# Patient Record
Sex: FEMALE | Race: OTHER | HISPANIC OR LATINO
[De-identification: names, ages, dates, MRNs, and addresses within clinical notes are randomized per-mention and may not be internally consistent; named-entity substitution may affect disease eponyms.]

---

## 2017-03-23 ENCOUNTER — RECORD ABSTRACTING (OUTPATIENT)
Age: 47
End: 2017-03-23

## 2017-03-23 DIAGNOSIS — J45.909 UNSPECIFIED ASTHMA, UNCOMPLICATED: ICD-10-CM

## 2017-03-23 DIAGNOSIS — Z88.9 ALLERGY STATUS TO UNSPECIFIED DRUGS, MEDICAMENTS AND BIOLOGICAL SUBSTANCES: ICD-10-CM

## 2017-03-23 DIAGNOSIS — Z12.4 ENCOUNTER FOR SCREENING FOR MALIGNANT NEOPLASM OF CERVIX: ICD-10-CM

## 2017-03-23 DIAGNOSIS — Z83.3 FAMILY HISTORY OF DIABETES MELLITUS: ICD-10-CM

## 2017-03-23 DIAGNOSIS — B96.89 ACUTE VAGINITIS: ICD-10-CM

## 2017-03-23 DIAGNOSIS — F41.9 ANXIETY DISORDER, UNSPECIFIED: ICD-10-CM

## 2017-03-23 DIAGNOSIS — Z78.9 OTHER SPECIFIED HEALTH STATUS: ICD-10-CM

## 2017-03-23 DIAGNOSIS — Z80.3 FAMILY HISTORY OF MALIGNANT NEOPLASM OF BREAST: ICD-10-CM

## 2017-03-23 DIAGNOSIS — Z82.49 FAMILY HISTORY OF ISCHEMIC HEART DISEASE AND OTHER DISEASES OF THE CIRCULATORY SYSTEM: ICD-10-CM

## 2017-03-23 DIAGNOSIS — Z33.2 ENCOUNTER FOR ELECTIVE TERMINATION OF PREGNANCY: ICD-10-CM

## 2017-03-23 DIAGNOSIS — I10 ESSENTIAL (PRIMARY) HYPERTENSION: ICD-10-CM

## 2017-03-23 DIAGNOSIS — N76.0 ACUTE VAGINITIS: ICD-10-CM

## 2017-03-23 DIAGNOSIS — F32.9 MAJOR DEPRESSIVE DISORDER, SINGLE EPISODE, UNSPECIFIED: ICD-10-CM

## 2017-03-23 RX ORDER — ALBUTEROL 90 MCG
AEROSOL (GRAM) INHALATION
Refills: 0 | Status: ACTIVE | COMMUNITY

## 2017-03-23 RX ORDER — ALBUTEROL SULFATE 90 UG/1
AEROSOL, METERED RESPIRATORY (INHALATION)
Refills: 0 | Status: ACTIVE | COMMUNITY

## 2017-03-23 RX ORDER — MONTELUKAST SODIUM 10 MG/1
TABLET, FILM COATED ORAL
Refills: 0 | Status: ACTIVE | COMMUNITY

## 2017-04-02 ENCOUNTER — INPATIENT (INPATIENT)
Facility: HOSPITAL | Age: 47
LOS: 3 days | Discharge: HOME | End: 2017-04-06
Attending: SPECIALIST

## 2017-04-21 ENCOUNTER — EMERGENCY (EMERGENCY)
Facility: HOSPITAL | Age: 47
LOS: 1 days | Discharge: HOME | End: 2017-04-21
Attending: EMERGENCY MEDICINE | Admitting: EMERGENCY MEDICINE
Payer: MEDICARE

## 2017-04-21 VITALS
DIASTOLIC BLOOD PRESSURE: 88 MMHG | TEMPERATURE: 98 F | WEIGHT: 141.98 LBS | RESPIRATION RATE: 16 BRPM | HEIGHT: 67 IN | SYSTOLIC BLOOD PRESSURE: 157 MMHG | OXYGEN SATURATION: 98 % | HEART RATE: 90 BPM

## 2017-04-21 VITALS
TEMPERATURE: 98 F | OXYGEN SATURATION: 100 % | HEART RATE: 78 BPM | RESPIRATION RATE: 16 BRPM | DIASTOLIC BLOOD PRESSURE: 92 MMHG | SYSTOLIC BLOOD PRESSURE: 142 MMHG

## 2017-04-21 DIAGNOSIS — M54.9 DORSALGIA, UNSPECIFIED: ICD-10-CM

## 2017-04-21 DIAGNOSIS — Z91.012 ALLERGY TO EGGS: ICD-10-CM

## 2017-04-21 DIAGNOSIS — Z88.7 ALLERGY STATUS TO SERUM AND VACCINE: ICD-10-CM

## 2017-04-21 DIAGNOSIS — Z79.52 LONG TERM (CURRENT) USE OF SYSTEMIC STEROIDS: ICD-10-CM

## 2017-04-21 DIAGNOSIS — S46.912A STRAIN OF UNSPECIFIED MUSCLE, FASCIA AND TENDON AT SHOULDER AND UPPER ARM LEVEL, LEFT ARM, INITIAL ENCOUNTER: ICD-10-CM

## 2017-04-21 DIAGNOSIS — S16.1XXA STRAIN OF MUSCLE, FASCIA AND TENDON AT NECK LEVEL, INITIAL ENCOUNTER: ICD-10-CM

## 2017-04-21 DIAGNOSIS — S46.911A STRAIN OF UNSPECIFIED MUSCLE, FASCIA AND TENDON AT SHOULDER AND UPPER ARM LEVEL, RIGHT ARM, INITIAL ENCOUNTER: ICD-10-CM

## 2017-04-21 DIAGNOSIS — J45.909 UNSPECIFIED ASTHMA, UNCOMPLICATED: ICD-10-CM

## 2017-04-21 DIAGNOSIS — Z79.899 OTHER LONG TERM (CURRENT) DRUG THERAPY: ICD-10-CM

## 2017-04-21 PROCEDURE — 99283 EMERGENCY DEPT VISIT LOW MDM: CPT | Mod: 25

## 2017-04-21 PROCEDURE — 99284 EMERGENCY DEPT VISIT MOD MDM: CPT

## 2017-04-21 RX ORDER — KETOROLAC TROMETHAMINE 30 MG/ML
30 SYRINGE (ML) INJECTION ONCE
Qty: 0 | Refills: 0 | Status: DISCONTINUED | OUTPATIENT
Start: 2017-04-21 | End: 2017-04-21

## 2017-04-21 RX ADMIN — Medication 30 MILLIGRAM(S): at 13:55

## 2017-04-21 NOTE — ED PROVIDER NOTE - MUSCULOSKELETAL, MLM
Spine appears normal, range of motion is not limited, no muscle or joint tenderness, pain along sts around scalpula b/l

## 2017-04-21 NOTE — ED PROVIDER NOTE - MEDICAL DECISION MAKING DETAILS
EO 46 yo otherwise healthy f presents to ed for neck pain b/l arm pain and stiffness for the past few months.  Pt reports she has been seen at multiple different hospitals and was given rx for baclofen and robaxin but ran out yesterday.  Pt likely with muscle strain/spasm, gave a dose of toradol reassessed, reports symptomatic improvement will refer to outpatient ortho

## 2017-04-21 NOTE — ED PROVIDER NOTE - OBJECTIVE STATEMENT
EO 48 yo otherwise healthy f presents to ed for neck pain b/l arm pain and stiffness for the past few months.  Pt reports she has been seen at multiple different hospitals and was given rx for baclofen and robaxin but ran out yesterday.  Pt otherwise denies fevers, chills, numbness/accidents/injuries traumas, numbness tingling.  Pt has not taken anything for pain today

## 2017-04-21 NOTE — ED ADULT TRIAGE NOTE - CHIEF COMPLAINT QUOTE
Pt c/o L arm pain and back pain since March, pt states she was seen then and told she had swelling in her spine. Pt states pain worsening since yesterday.

## 2017-04-21 NOTE — ED ADULT NURSE NOTE - OBJECTIVE STATEMENT
presented to ED with neck pressure down shoulder with numbness down left arm since March. Admitted in [facility] for same. presented to ED with neck pressure down shoulder with numbness down left arm since March. Admitted in [facility] for same. Pain worsened yesterday. Decreased mobility.

## 2017-04-21 NOTE — ED ADULT NURSE NOTE - NS TRANSFER DISPOSITION PATIENT BELONGINGS
Observation/Bedded OP   PT Plan of Care Note    Primary Insurance: [employer]    Secondary Insurance: N/A    Note sent for required physician co-signature: No          Is the patient currently receiving skilled home care services (RN or therapy) No    Diagnosis:   1. Syncope, unspecified syncope type    2. Weakness    3. Falls, initial encounter    4. Spondylosis, cervical, with myelopathy    5. Cerebrovascular small vessel disease    6. Diabetic peripheral neuropathy    7. Polypharmacy        Co morbidities:   Patient Active Problem List   Diagnosis   • Type II or unspecified type diabetes mellitus without mention of complication, not stated as uncontrolled   • Essential hypertension, benign   • Other and unspecified hyperlipidemia   • Chronic obstructive asthma, unspecified   • Generalized anxiety disorder   • Colon polyps       Clinical Presentation: Evolving clinical presentation with changing characteristics                      Therapy Diagnosis: Impaired gait    Functional Status: (as of date/time noted)  Bed Mobility:  Rolling to the Right: Supervision (Supv) (03/25/17 1500)  Rolling to the Left: Supervision (Supv) (03/25/17 1500)  Boosting: Supervision (Supv) (03/25/17 1500)  Supine to Sit: Supervision (Supv) (03/25/17 1500)  Sit to Supine: Supervision (Supv) (03/25/17 1500)  Bed Mobility Comments: completes all bed mobility with supervision, manages covers well, modified independent sitting balance (03/25/17 1500)    Transfers:  Sit to Stand: Supervision (Supv) (03/25/17 1500)  Stand to Sit: Supervision (Supv) (03/25/17 1500)  Stand Pivot Transfers: Supervision (Supv) (03/25/17 1500)  Toilet Transfers: Supervision (Supv) (03/25/17 1500)  Assistive Device/: 2-wheeled walker (03/25/17 1500)  Transfer Comments 1: completes all transfers on/ pff various surfaces with supervision, good balance with initial stance, follows safety precautions (03/25/17 1500)  Transfer  Comments 2: cues for proper hand placement and ww proximity (03/24/17 5665)     Ambulation:  Gait  Gait Assistance: Supervision (Supv) (03/25/17 1500)  Assistive Device/: 2-wheeled walker (03/25/17 1500)  Ambulation Distance (Feet): 175 Feet (03/25/17 1500)  Gait Comments 1: ambulation in room and hallways with supervision using 2WW (03/25/17 1500)  Gait Comments 2: no loss of balance with turns or obstacle management (03/25/17 1500)  Gait Comments 3: good activity tolerance, denies SOB or weakness (03/25/17 1500)         Stairs:     See PT Flowsheet for full details regarding the PT therapy provided.    AM-PAC Outcomes  Basic Mobility Domain  How much help from another person does the patient currently need? *  Task Score  Norm   1. Turning from back to side while in flat bed without use of bedrails? 3 - A Little   2. Moving from lying on back to sitting:   3 - A Little   3. Moving to and from a bed to a chair (including wheelchair) 3 - A Little   4. Standing up from a chair using your arms 3 - A Little   5. To walk in a hospital room? 3 - A Little   6. Climbing 3-5 steps with a railing?  3 - A Little   Raw Score: 18/24   Converted Score: 41.05 (Raw score = 18)   G code conversion CK: 40- 59% impairment (Raw score: 13-18)     Converted score >42.9 predictive of discharge to home  *Score based on clinical judgment/expected performance, may not have been performed during this session  Scoring Guidelines  1) Patient may use assistive devices unless otherwise indicated in question  2) Do not consider help for management of medical devices only (IV poles, catheters, NG, etc) as part of assist level  3) If activity was not observed and patient unable to do the activity, select \"Total\" .  If the patient can do the activity but was not directly observed, use profession judgment to determine how much assistance would be needed.    Education: On this date, the patient was educated on safety with mobility skills  using 2WW.   The response to education was: Needs reinforcement    Assessment:  Patient's overall level of function is supervision to min assist for bed mobility, transfers and ambulation 175 feet with 2WW. Patient demonstrates remarkable improvement in safety and mobility skills today compared to previous PT session, progressing from total assist level to min assist/ supervision for pivot transfers. Patient may be able to return home directly with significant other if continues to make progress. Will see patient for PT tomorrow to determine if safe for home D/C. Will trial cane and stair management.     Barriers to Discharge: hx of cognitive deficits per chart, hx of multiple falls, weakness, imbalance    Long Term Treatment Goals:  Bed Mobility Discharge Goal: modified independent  Transfer Discharge Goal: supv with ww  Ambulation Discharge Goal: min assist with ww 50 feet  Stairs Discharge Goal: 4 stairs with rail min assist  Therapeutic Exercise Discharge Goal:    Other Discharge Goal 1: Karimi score of 35/56 points for gait safety with ww  Other Discharge Goal 2:      Treatment Interventions: Functional transfer training, Strengthening, Endurance training, Cognitive reorientation, Patient/Family training, Equipment eval/education, Bed mobility, Gait training, Compensatory technique education, Stairs retraining, Safety Education, Neuromuscular re-education  Amount: 31-60 minutes  Frequency:   6x/week  Duration: 5 days    Plan for Next Session: bring cane for transfers and ambulation, stair management     Recommendations for Discharge: Home    Billing Information:    Timed Procedures:   $ Gait Trainin-22 mins,  $ Therapy Activities per 15 min: 23-37 mins,   Total Treatment Time:   PT Time Spent: 45 minutes    Timed Treatment Minutes:  OBS Patients Only:  PT Timed Code TX Minutes: 45 minutes    The co-signature indicates that the physician certifies the need for PT furnished under this plan of treatment while  under his/her care.   with patient

## 2017-06-27 DIAGNOSIS — M62.838 OTHER MUSCLE SPASM: ICD-10-CM

## 2017-06-27 DIAGNOSIS — F32.9 MAJOR DEPRESSIVE DISORDER, SINGLE EPISODE, UNSPECIFIED: ICD-10-CM

## 2017-06-27 DIAGNOSIS — R20.8 OTHER DISTURBANCES OF SKIN SENSATION: ICD-10-CM

## 2017-06-27 DIAGNOSIS — M54.2 CERVICALGIA: ICD-10-CM

## 2017-06-27 DIAGNOSIS — J45.909 UNSPECIFIED ASTHMA, UNCOMPLICATED: ICD-10-CM

## 2017-06-27 DIAGNOSIS — F41.9 ANXIETY DISORDER, UNSPECIFIED: ICD-10-CM

## 2017-06-27 DIAGNOSIS — M48.02 SPINAL STENOSIS, CERVICAL REGION: ICD-10-CM

## 2017-06-27 DIAGNOSIS — M50.122 CERVICAL DISC DISORDER AT C5-C6 LEVEL WITH RADICULOPATHY: ICD-10-CM

## 2017-08-09 ENCOUNTER — APPOINTMENT (OUTPATIENT)
Dept: VASCULAR SURGERY | Facility: CLINIC | Age: 47
End: 2017-08-09
Payer: MEDICARE

## 2017-08-09 VITALS
HEIGHT: 67 IN | SYSTOLIC BLOOD PRESSURE: 118 MMHG | WEIGHT: 142 LBS | DIASTOLIC BLOOD PRESSURE: 80 MMHG | BODY MASS INDEX: 22.29 KG/M2

## 2017-08-09 DIAGNOSIS — M79.605 PAIN IN RIGHT LEG: ICD-10-CM

## 2017-08-09 DIAGNOSIS — M79.604 PAIN IN RIGHT LEG: ICD-10-CM

## 2017-08-09 PROCEDURE — 99213 OFFICE O/P EST LOW 20 MIN: CPT

## 2017-08-09 PROCEDURE — 93970 EXTREMITY STUDY: CPT

## 2017-09-03 ENCOUNTER — EMERGENCY (EMERGENCY)
Facility: HOSPITAL | Age: 47
LOS: 0 days | Discharge: HOME | End: 2017-09-03

## 2017-09-03 DIAGNOSIS — M54.2 CERVICALGIA: ICD-10-CM

## 2017-09-03 DIAGNOSIS — F41.9 ANXIETY DISORDER, UNSPECIFIED: ICD-10-CM

## 2017-09-03 DIAGNOSIS — N39.0 URINARY TRACT INFECTION, SITE NOT SPECIFIED: ICD-10-CM

## 2017-09-03 DIAGNOSIS — M54.12 RADICULOPATHY, CERVICAL REGION: ICD-10-CM

## 2017-09-03 DIAGNOSIS — J45.909 UNSPECIFIED ASTHMA, UNCOMPLICATED: ICD-10-CM

## 2017-09-03 DIAGNOSIS — Z91.012 ALLERGY TO EGGS: ICD-10-CM

## 2017-09-03 DIAGNOSIS — Z88.8 ALLERGY STATUS TO OTHER DRUGS, MEDICAMENTS AND BIOLOGICAL SUBSTANCES: ICD-10-CM

## 2017-09-03 DIAGNOSIS — R30.0 DYSURIA: ICD-10-CM

## 2017-09-03 DIAGNOSIS — F32.9 MAJOR DEPRESSIVE DISORDER, SINGLE EPISODE, UNSPECIFIED: ICD-10-CM

## 2017-09-25 ENCOUNTER — EMERGENCY (EMERGENCY)
Facility: HOSPITAL | Age: 47
LOS: 0 days | Discharge: HOME | End: 2017-09-25

## 2017-09-25 DIAGNOSIS — M54.2 CERVICALGIA: ICD-10-CM

## 2017-09-25 DIAGNOSIS — Z88.7 ALLERGY STATUS TO SERUM AND VACCINE: ICD-10-CM

## 2017-09-25 DIAGNOSIS — R10.30 LOWER ABDOMINAL PAIN, UNSPECIFIED: ICD-10-CM

## 2017-09-25 DIAGNOSIS — F32.9 MAJOR DEPRESSIVE DISORDER, SINGLE EPISODE, UNSPECIFIED: ICD-10-CM

## 2017-09-25 DIAGNOSIS — N83.201 UNSPECIFIED OVARIAN CYST, RIGHT SIDE: ICD-10-CM

## 2017-09-25 DIAGNOSIS — Z91.012 ALLERGY TO EGGS: ICD-10-CM

## 2017-09-25 DIAGNOSIS — N39.0 URINARY TRACT INFECTION, SITE NOT SPECIFIED: ICD-10-CM

## 2017-09-25 DIAGNOSIS — J45.909 UNSPECIFIED ASTHMA, UNCOMPLICATED: ICD-10-CM

## 2017-09-25 DIAGNOSIS — M54.12 RADICULOPATHY, CERVICAL REGION: ICD-10-CM

## 2017-09-28 ENCOUNTER — OUTPATIENT (OUTPATIENT)
Dept: OUTPATIENT SERVICES | Facility: HOSPITAL | Age: 47
LOS: 1 days | Discharge: HOME | End: 2017-09-28

## 2017-09-28 ENCOUNTER — APPOINTMENT (OUTPATIENT)
Dept: OBGYN | Facility: CLINIC | Age: 47
End: 2017-09-28

## 2017-09-28 VITALS
WEIGHT: 151.7 LBS | HEIGHT: 67 IN | SYSTOLIC BLOOD PRESSURE: 120 MMHG | BODY MASS INDEX: 23.81 KG/M2 | DIASTOLIC BLOOD PRESSURE: 70 MMHG

## 2017-09-28 DIAGNOSIS — M54.12 RADICULOPATHY, CERVICAL REGION: ICD-10-CM

## 2017-09-28 DIAGNOSIS — Z01.419 ENCOUNTER FOR GYNECOLOGICAL EXAMINATION (GENERAL) (ROUTINE) W/OUT ABNORMAL FINDINGS: ICD-10-CM

## 2017-09-28 DIAGNOSIS — M54.2 CERVICALGIA: ICD-10-CM

## 2017-09-28 DIAGNOSIS — N83.209 UNSPECIFIED OVARIAN CYST, UNSPECIFIED SIDE: ICD-10-CM

## 2017-09-28 DIAGNOSIS — N39.0 URINARY TRACT INFECTION, SITE NOT SPECIFIED: ICD-10-CM

## 2017-09-28 DIAGNOSIS — A49.9 URINARY TRACT INFECTION, SITE NOT SPECIFIED: ICD-10-CM

## 2017-10-02 ENCOUNTER — RESULT REVIEW (OUTPATIENT)
Age: 47
End: 2017-10-02

## 2017-10-10 DIAGNOSIS — Z01.419 ENCOUNTER FOR GYNECOLOGICAL EXAMINATION (GENERAL) (ROUTINE) WITHOUT ABNORMAL FINDINGS: ICD-10-CM

## 2017-10-10 LAB — HPV I/H RISK 1 DNA CVX QL PROBE+SIG AMP: NOT DETECTED

## 2017-10-16 ENCOUNTER — APPOINTMENT (OUTPATIENT)
Dept: ANTEPARTUM | Facility: CLINIC | Age: 47
End: 2017-10-16

## 2017-10-30 ENCOUNTER — APPOINTMENT (OUTPATIENT)
Dept: OBGYN | Facility: CLINIC | Age: 47
End: 2017-10-30

## 2017-12-29 ENCOUNTER — OUTPATIENT (OUTPATIENT)
Dept: OUTPATIENT SERVICES | Facility: HOSPITAL | Age: 47
LOS: 1 days | Discharge: HOME | End: 2017-12-29

## 2017-12-29 DIAGNOSIS — M50.01 CERVICAL DISC DISORDER WITH MYELOPATHY, HIGH CERVICAL REGION: ICD-10-CM

## 2018-01-05 ENCOUNTER — OUTPATIENT (OUTPATIENT)
Dept: OUTPATIENT SERVICES | Facility: HOSPITAL | Age: 48
LOS: 1 days | Discharge: HOME | End: 2018-01-05

## 2018-01-05 DIAGNOSIS — M50.01 CERVICAL DISC DISORDER WITH MYELOPATHY, HIGH CERVICAL REGION: ICD-10-CM

## 2018-01-30 DIAGNOSIS — M54.12 RADICULOPATHY, CERVICAL REGION: ICD-10-CM

## 2018-01-30 DIAGNOSIS — M54.2 CERVICALGIA: ICD-10-CM

## 2018-02-04 DIAGNOSIS — M54.2 CERVICALGIA: ICD-10-CM

## 2018-02-04 DIAGNOSIS — M54.12 RADICULOPATHY, CERVICAL REGION: ICD-10-CM

## 2018-03-21 ENCOUNTER — EMERGENCY (EMERGENCY)
Facility: HOSPITAL | Age: 48
LOS: 0 days | Discharge: HOME | End: 2018-03-22
Attending: EMERGENCY MEDICINE

## 2018-03-21 VITALS
DIASTOLIC BLOOD PRESSURE: 85 MMHG | TEMPERATURE: 99 F | RESPIRATION RATE: 20 BRPM | OXYGEN SATURATION: 98 % | HEART RATE: 78 BPM | SYSTOLIC BLOOD PRESSURE: 127 MMHG

## 2018-03-21 DIAGNOSIS — J45.909 UNSPECIFIED ASTHMA, UNCOMPLICATED: ICD-10-CM

## 2018-03-21 DIAGNOSIS — I10 ESSENTIAL (PRIMARY) HYPERTENSION: ICD-10-CM

## 2018-03-21 DIAGNOSIS — R50.9 FEVER, UNSPECIFIED: ICD-10-CM

## 2018-03-21 DIAGNOSIS — R11.2 NAUSEA WITH VOMITING, UNSPECIFIED: ICD-10-CM

## 2018-03-21 DIAGNOSIS — F41.9 ANXIETY DISORDER, UNSPECIFIED: ICD-10-CM

## 2018-03-21 DIAGNOSIS — F31.9 BIPOLAR DISORDER, UNSPECIFIED: ICD-10-CM

## 2018-03-21 DIAGNOSIS — R10.9 UNSPECIFIED ABDOMINAL PAIN: ICD-10-CM

## 2018-03-21 DIAGNOSIS — R10.13 EPIGASTRIC PAIN: ICD-10-CM

## 2018-03-21 DIAGNOSIS — Z91.012 ALLERGY TO EGGS: ICD-10-CM

## 2018-03-21 DIAGNOSIS — Z88.7 ALLERGY STATUS TO SERUM AND VACCINE: ICD-10-CM

## 2018-03-21 LAB
ALBUMIN SERPL ELPH-MCNC: 4.1 G/DL — SIGNIFICANT CHANGE UP (ref 3.5–5.2)
ALP SERPL-CCNC: 31 U/L — SIGNIFICANT CHANGE UP (ref 30–115)
ALT FLD-CCNC: 7 U/L — SIGNIFICANT CHANGE UP (ref 0–41)
ANION GAP SERPL CALC-SCNC: 12 MMOL/L — SIGNIFICANT CHANGE UP (ref 7–14)
APPEARANCE UR: CLEAR — SIGNIFICANT CHANGE UP
AST SERPL-CCNC: 12 U/L — SIGNIFICANT CHANGE UP (ref 0–41)
BASOPHILS # BLD AUTO: 0.04 K/UL — SIGNIFICANT CHANGE UP (ref 0–0.2)
BASOPHILS NFR BLD AUTO: 0.8 % — SIGNIFICANT CHANGE UP (ref 0–1)
BILIRUB SERPL-MCNC: 0.4 MG/DL — SIGNIFICANT CHANGE UP (ref 0.2–1.2)
BILIRUB UR-MCNC: NEGATIVE — SIGNIFICANT CHANGE UP
BUN SERPL-MCNC: 17 MG/DL — SIGNIFICANT CHANGE UP (ref 10–20)
CALCIUM SERPL-MCNC: 8.8 MG/DL — SIGNIFICANT CHANGE UP (ref 8.5–10.1)
CHLORIDE SERPL-SCNC: 101 MMOL/L — SIGNIFICANT CHANGE UP (ref 98–110)
CO2 SERPL-SCNC: 27 MMOL/L — SIGNIFICANT CHANGE UP (ref 17–32)
COLOR SPEC: YELLOW — SIGNIFICANT CHANGE UP
CREAT SERPL-MCNC: 0.9 MG/DL — SIGNIFICANT CHANGE UP (ref 0.7–1.5)
DIFF PNL FLD: (no result)
EOSINOPHIL # BLD AUTO: 0.02 K/UL — SIGNIFICANT CHANGE UP (ref 0–0.7)
EOSINOPHIL NFR BLD AUTO: 0.4 % — SIGNIFICANT CHANGE UP (ref 0–8)
GLUCOSE SERPL-MCNC: 90 MG/DL — SIGNIFICANT CHANGE UP (ref 70–110)
GLUCOSE UR QL: NEGATIVE — SIGNIFICANT CHANGE UP
HCT VFR BLD CALC: 29.5 % — LOW (ref 37–47)
HGB BLD-MCNC: 9.6 G/DL — LOW (ref 12–16)
IMM GRANULOCYTES NFR BLD AUTO: 0.2 % — SIGNIFICANT CHANGE UP (ref 0.1–0.3)
KETONES UR-MCNC: NEGATIVE — SIGNIFICANT CHANGE UP
LACTATE SERPL-SCNC: 2.1 MMOL/L — SIGNIFICANT CHANGE UP (ref 0.5–2.2)
LEUKOCYTE ESTERASE UR-ACNC: NEGATIVE — SIGNIFICANT CHANGE UP
LIDOCAIN IGE QN: 50 U/L — SIGNIFICANT CHANGE UP (ref 7–60)
LYMPHOCYTES # BLD AUTO: 0.99 K/UL — LOW (ref 1.2–3.4)
LYMPHOCYTES # BLD AUTO: 20.9 % — SIGNIFICANT CHANGE UP (ref 20.5–51.1)
MCHC RBC-ENTMCNC: 28.7 PG — SIGNIFICANT CHANGE UP (ref 27–31)
MCHC RBC-ENTMCNC: 32.5 G/DL — SIGNIFICANT CHANGE UP (ref 32–37)
MCV RBC AUTO: 88.3 FL — SIGNIFICANT CHANGE UP (ref 81–99)
MONOCYTES # BLD AUTO: 0.32 K/UL — SIGNIFICANT CHANGE UP (ref 0.1–0.6)
MONOCYTES NFR BLD AUTO: 6.8 % — SIGNIFICANT CHANGE UP (ref 1.7–9.3)
NEUTROPHILS # BLD AUTO: 3.36 K/UL — SIGNIFICANT CHANGE UP (ref 1.4–6.5)
NEUTROPHILS NFR BLD AUTO: 70.9 % — SIGNIFICANT CHANGE UP (ref 42.2–75.2)
NITRITE UR-MCNC: NEGATIVE — SIGNIFICANT CHANGE UP
PH UR: 7.5 — SIGNIFICANT CHANGE UP (ref 5–8)
PLATELET # BLD AUTO: 239 K/UL — SIGNIFICANT CHANGE UP (ref 130–400)
POTASSIUM SERPL-MCNC: 3.6 MMOL/L — SIGNIFICANT CHANGE UP (ref 3.5–5)
POTASSIUM SERPL-SCNC: 3.6 MMOL/L — SIGNIFICANT CHANGE UP (ref 3.5–5)
PROT SERPL-MCNC: 6.9 G/DL — SIGNIFICANT CHANGE UP (ref 6–8)
PROT UR-MCNC: NEGATIVE — SIGNIFICANT CHANGE UP
RBC # BLD: 3.34 M/UL — LOW (ref 4.2–5.4)
RBC # FLD: 14.2 % — SIGNIFICANT CHANGE UP (ref 11.5–14.5)
SODIUM SERPL-SCNC: 140 MMOL/L — SIGNIFICANT CHANGE UP (ref 135–146)
SP GR SPEC: 1.01 — SIGNIFICANT CHANGE UP (ref 1.01–1.03)
UROBILINOGEN FLD QL: 0.2 — SIGNIFICANT CHANGE UP (ref 0.2–0.2)
WBC # BLD: 4.74 K/UL — LOW (ref 4.8–10.8)
WBC # FLD AUTO: 4.74 K/UL — LOW (ref 4.8–10.8)

## 2018-03-21 RX ORDER — MORPHINE SULFATE 50 MG/1
4 CAPSULE, EXTENDED RELEASE ORAL ONCE
Qty: 0 | Refills: 0 | Status: DISCONTINUED | OUTPATIENT
Start: 2018-03-21 | End: 2018-03-21

## 2018-03-21 RX ORDER — SODIUM CHLORIDE 9 MG/ML
1000 INJECTION INTRAMUSCULAR; INTRAVENOUS; SUBCUTANEOUS ONCE
Qty: 0 | Refills: 0 | Status: COMPLETED | OUTPATIENT
Start: 2018-03-21 | End: 2018-03-21

## 2018-03-21 RX ORDER — ONDANSETRON 8 MG/1
4 TABLET, FILM COATED ORAL ONCE
Qty: 0 | Refills: 0 | Status: COMPLETED | OUTPATIENT
Start: 2018-03-21 | End: 2018-03-21

## 2018-03-21 RX ORDER — METHOCARBAMOL 500 MG/1
0 TABLET, FILM COATED ORAL
Qty: 0 | Refills: 0 | COMMUNITY

## 2018-03-21 RX ORDER — MORPHINE SULFATE 50 MG/1
2 CAPSULE, EXTENDED RELEASE ORAL ONCE
Qty: 0 | Refills: 0 | Status: DISCONTINUED | OUTPATIENT
Start: 2018-03-21 | End: 2018-03-21

## 2018-03-21 RX ORDER — ACETAMINOPHEN 500 MG
0 TABLET ORAL
Qty: 0 | Refills: 0 | COMMUNITY

## 2018-03-21 RX ORDER — FAMOTIDINE 10 MG/ML
20 INJECTION INTRAVENOUS ONCE
Qty: 0 | Refills: 0 | Status: COMPLETED | OUTPATIENT
Start: 2018-03-21 | End: 2018-03-21

## 2018-03-21 RX ADMIN — ONDANSETRON 4 MILLIGRAM(S): 8 TABLET, FILM COATED ORAL at 21:46

## 2018-03-21 RX ADMIN — FAMOTIDINE 20 MILLIGRAM(S): 10 INJECTION INTRAVENOUS at 21:46

## 2018-03-21 RX ADMIN — MORPHINE SULFATE 4 MILLIGRAM(S): 50 CAPSULE, EXTENDED RELEASE ORAL at 21:46

## 2018-03-21 RX ADMIN — MORPHINE SULFATE 2 MILLIGRAM(S): 50 CAPSULE, EXTENDED RELEASE ORAL at 21:00

## 2018-03-21 RX ADMIN — SODIUM CHLORIDE 1000 MILLILITER(S): 9 INJECTION INTRAMUSCULAR; INTRAVENOUS; SUBCUTANEOUS at 21:46

## 2018-03-21 NOTE — ED PROVIDER NOTE - PROGRESS NOTE DETAILS
Bedside sono - negative fast. No GB wall thickening, pericholecystic fluid. CBD <4mm CORNELIUS villasenor s/o from Dr. Winslow Feeling and looking significantly better. Feeling and looking significantly better. Baseline Hb 9.6 - 10 from prior charts.

## 2018-03-21 NOTE — ED PROVIDER NOTE - MEDICAL DECISION MAKING DETAILS
pt signed out to me - 49yo f non-contrib pmh p/w epigastric pain x 1d + vomiting - labs w/unchanged mild anemia, nl CT AP - sx improved on reassessment, all result d/w pt, discharged to home

## 2018-03-21 NOTE — ED PROVIDER NOTE - OBJECTIVE STATEMENT
48 M with a hx of asthma, anxiety, depression, presents with epigastric pain x 1 day. Started yesterday after eating. Located in epigastrium, descibed as sharp, nonradiating. Associated  with subjective fevers, chills, nausea and 4-5 episodes on NBNB emesis. Last BM 2 days ago, normal. Denies CP, SOB, diarrhea, back pain, dysuria.

## 2018-03-21 NOTE — ED PROVIDER NOTE - NS ED ROS FT
Constitutional: See HPI.  Eyes: No visual changes, eye pain or discharge.  ENMT: No hearing changes, pain, discharge or infections. No neck pain or stiffness.  Cardiac: No chest pain, SOB or edema. No chest pain with exertion.  Respiratory: No cough or respiratory distress.   GI: +nausea, vomiting, abdominal pain.  : No dysuria, frequency or burning.  MS: No myalgia, muscle weakness, joint pain or back pain.  Neuro: No headache or weakness. No LOC.  Skin: No skin rash.

## 2018-03-21 NOTE — ED PROVIDER NOTE - PHYSICAL EXAMINATION
AOx4, visibly uncomfortable, speaking in full sentences. Skin  warm and dry, no acute rash. Head normocephalic, atraumatic. PERRLA/EOMI, conjunctiva and sclera clear. MM moist, no nasal discharge.  Pharynx and TM's unremarkable.  Neck supple nt, no meningeal signs. Heart RRR s1s2 nl, no rub/murmur. Lungs- No retractions, BS equal, CTAB. Abdomen soft nd tender epigastrium with voluntary guarding, no rebound. Extremities- moves all, +equal distal pulses, brisk cap refill, sensation wnl, normal ROM. No LE edema, calves nttp b/l.

## 2018-03-22 VITALS
HEART RATE: 71 BPM | RESPIRATION RATE: 18 BRPM | DIASTOLIC BLOOD PRESSURE: 77 MMHG | TEMPERATURE: 97 F | SYSTOLIC BLOOD PRESSURE: 132 MMHG | OXYGEN SATURATION: 98 %

## 2018-03-22 RX ORDER — FAMOTIDINE 10 MG/ML
1 INJECTION INTRAVENOUS
Qty: 14 | Refills: 0 | OUTPATIENT
Start: 2018-03-22 | End: 2018-04-04

## 2018-03-22 RX ORDER — FAMOTIDINE 10 MG/ML
20 INJECTION INTRAVENOUS DAILY
Qty: 0 | Refills: 0 | Status: DISCONTINUED | OUTPATIENT
Start: 2018-03-22 | End: 2018-03-22

## 2018-03-22 RX ADMIN — FAMOTIDINE 20 MILLIGRAM(S): 10 INJECTION INTRAVENOUS at 01:59

## 2018-03-22 RX ADMIN — MORPHINE SULFATE 2 MILLIGRAM(S): 50 CAPSULE, EXTENDED RELEASE ORAL at 01:00

## 2018-03-22 RX ADMIN — SODIUM CHLORIDE 1000 MILLILITER(S): 9 INJECTION INTRAMUSCULAR; INTRAVENOUS; SUBCUTANEOUS at 01:30

## 2018-03-22 RX ADMIN — MORPHINE SULFATE 4 MILLIGRAM(S): 50 CAPSULE, EXTENDED RELEASE ORAL at 01:49

## 2018-05-15 ENCOUNTER — OUTPATIENT (OUTPATIENT)
Dept: OUTPATIENT SERVICES | Facility: HOSPITAL | Age: 48
LOS: 1 days | Discharge: HOME | End: 2018-05-15

## 2018-05-15 DIAGNOSIS — R11.0 NAUSEA: ICD-10-CM

## 2018-05-15 DIAGNOSIS — R10.13 EPIGASTRIC PAIN: ICD-10-CM

## 2018-05-31 ENCOUNTER — FORM ENCOUNTER (OUTPATIENT)
Age: 48
End: 2018-05-31

## 2018-06-01 ENCOUNTER — OUTPATIENT (OUTPATIENT)
Dept: OUTPATIENT SERVICES | Facility: HOSPITAL | Age: 48
LOS: 1 days | Discharge: HOME | End: 2018-06-01

## 2018-06-01 DIAGNOSIS — R10.9 UNSPECIFIED ABDOMINAL PAIN: ICD-10-CM

## 2018-06-18 ENCOUNTER — OUTPATIENT (OUTPATIENT)
Dept: OUTPATIENT SERVICES | Facility: HOSPITAL | Age: 48
LOS: 1 days | Discharge: HOME | End: 2018-06-18

## 2018-06-18 ENCOUNTER — RESULT REVIEW (OUTPATIENT)
Age: 48
End: 2018-06-18

## 2018-06-18 VITALS
RESPIRATION RATE: 16 BRPM | TEMPERATURE: 99 F | HEART RATE: 66 BPM | SYSTOLIC BLOOD PRESSURE: 139 MMHG | WEIGHT: 141.98 LBS | OXYGEN SATURATION: 99 % | HEIGHT: 67 IN | DIASTOLIC BLOOD PRESSURE: 84 MMHG

## 2018-06-18 VITALS — SYSTOLIC BLOOD PRESSURE: 129 MMHG | HEART RATE: 74 BPM | DIASTOLIC BLOOD PRESSURE: 83 MMHG | RESPIRATION RATE: 17 BRPM

## 2018-06-18 DIAGNOSIS — Z98.890 OTHER SPECIFIED POSTPROCEDURAL STATES: Chronic | ICD-10-CM

## 2018-06-18 RX ORDER — RISPERIDONE 4 MG/1
1 TABLET ORAL
Qty: 0 | Refills: 0 | COMMUNITY

## 2018-06-18 RX ORDER — ALBUTEROL 90 UG/1
0 AEROSOL, METERED ORAL
Qty: 0 | Refills: 0 | COMMUNITY

## 2018-06-18 RX ORDER — GABAPENTIN 400 MG/1
1 CAPSULE ORAL
Qty: 0 | Refills: 0 | COMMUNITY

## 2018-06-18 NOTE — ASU DISCHARGE PLAN (ADULT/PEDIATRIC). - ASU FOLLOWUP
[facility]:  Ancram for Ambulatory Surgery... [facility]:  Ambulatory Surgery [facility]...

## 2018-06-18 NOTE — PRE-ANESTHESIA EVALUATION ADULT - NSANTHOSAYNRD_GEN_A_CORE
No. JS screening performed.  STOP BANG Legend: 0-2 = LOW Risk; 3-4 = INTERMEDIATE Risk; 5-8 = HIGH Risk

## 2018-06-18 NOTE — ASU DISCHARGE PLAN (ADULT/PEDIATRIC). - NOTIFY
Persistent Nausea and Vomiting/Bleeding that does not stop/Inability to Tolerate Liquids or Foods/Fever greater than 101/Excessive Diarrhea/Increased Irritability or Sluggishness/Pain not relieved by Medications

## 2018-06-18 NOTE — H&P PST ADULT - HISTORY OF PRESENT ILLNESS
the following is from the patient's recent office visit  CC: Epigastric pain  HPI: 48-year-old female who is seen in the emergency room at [facility] in March 2018 for epigastric pain that radiated up to her chest and down to her umbilicus. The pain began after eating and she described it as sharp. At that time it was associated with chills and fever. she also reported 4-5 episodes of nonbloody emesis.  She states that since then she had several more episodes including one in [place] that required another hospitalization. The pain lasts several hours and is worsened by lying down. She now states that the pain is burning in quality. She denies any difficulty breathing, melena, hematochezia, difficulty swallowing, early satiety, weight loss.  Past Medical History:  Anxiety, asthma, depression, torticollis. Hypertension, cervical radiculopathy    Allergies: Flu shot, egg  Medications: Risperidone, famotidine, methocarbamol, gabapentin, baclofen, acetaminophen, phenazopyridine  Social History:Nonsmoker, Nondrinker  Family History: Aunt with breast cancer

## 2018-06-21 DIAGNOSIS — I10 ESSENTIAL (PRIMARY) HYPERTENSION: ICD-10-CM

## 2018-06-21 DIAGNOSIS — K29.50 UNSPECIFIED CHRONIC GASTRITIS WITHOUT BLEEDING: ICD-10-CM

## 2018-06-21 DIAGNOSIS — R10.9 UNSPECIFIED ABDOMINAL PAIN: ICD-10-CM

## 2018-06-21 DIAGNOSIS — K29.80 DUODENITIS WITHOUT BLEEDING: ICD-10-CM

## 2018-06-21 LAB — SURGICAL PATHOLOGY STUDY: SIGNIFICANT CHANGE UP

## 2019-04-15 ENCOUNTER — EMERGENCY (EMERGENCY)
Facility: HOSPITAL | Age: 49
LOS: 0 days | Discharge: HOME | End: 2019-04-15
Admitting: EMERGENCY MEDICINE
Payer: MEDICARE

## 2019-04-15 VITALS
DIASTOLIC BLOOD PRESSURE: 98 MMHG | OXYGEN SATURATION: 99 % | WEIGHT: 141.98 LBS | HEIGHT: 67 IN | TEMPERATURE: 98 F | SYSTOLIC BLOOD PRESSURE: 168 MMHG | RESPIRATION RATE: 18 BRPM | HEART RATE: 89 BPM

## 2019-04-15 DIAGNOSIS — Z79.52 LONG TERM (CURRENT) USE OF SYSTEMIC STEROIDS: ICD-10-CM

## 2019-04-15 DIAGNOSIS — Z98.890 OTHER SPECIFIED POSTPROCEDURAL STATES: Chronic | ICD-10-CM

## 2019-04-15 DIAGNOSIS — Z79.51 LONG TERM (CURRENT) USE OF INHALED STEROIDS: ICD-10-CM

## 2019-04-15 DIAGNOSIS — M54.9 DORSALGIA, UNSPECIFIED: ICD-10-CM

## 2019-04-15 DIAGNOSIS — F20.9 SCHIZOPHRENIA, UNSPECIFIED: ICD-10-CM

## 2019-04-15 DIAGNOSIS — B34.9 VIRAL INFECTION, UNSPECIFIED: ICD-10-CM

## 2019-04-15 DIAGNOSIS — Z91.09 OTHER ALLERGY STATUS, OTHER THAN TO DRUGS AND BIOLOGICAL SUBSTANCES: ICD-10-CM

## 2019-04-15 DIAGNOSIS — I10 ESSENTIAL (PRIMARY) HYPERTENSION: ICD-10-CM

## 2019-04-15 DIAGNOSIS — J45.909 UNSPECIFIED ASTHMA, UNCOMPLICATED: ICD-10-CM

## 2019-04-15 DIAGNOSIS — Z79.899 OTHER LONG TERM (CURRENT) DRUG THERAPY: ICD-10-CM

## 2019-04-15 DIAGNOSIS — Z91.012 ALLERGY TO EGGS: ICD-10-CM

## 2019-04-15 DIAGNOSIS — Z98.890 OTHER SPECIFIED POSTPROCEDURAL STATES: ICD-10-CM

## 2019-04-15 PROCEDURE — 99283 EMERGENCY DEPT VISIT LOW MDM: CPT

## 2019-04-15 RX ORDER — KETOROLAC TROMETHAMINE 30 MG/ML
30 SYRINGE (ML) INJECTION ONCE
Qty: 0 | Refills: 0 | Status: DISCONTINUED | OUTPATIENT
Start: 2019-04-15 | End: 2019-04-15

## 2019-04-15 RX ORDER — IBUPROFEN 200 MG
600 TABLET ORAL ONCE
Qty: 0 | Refills: 0 | Status: COMPLETED | OUTPATIENT
Start: 2019-04-15 | End: 2019-04-15

## 2019-04-15 RX ADMIN — Medication 600 MILLIGRAM(S): at 21:56

## 2019-04-15 NOTE — ED PROVIDER NOTE - PHYSICAL EXAMINATION
GENERAL:  well appearing, non-toxic female in no acute distress  SKIN: skin warm, pink and dry. MMM. no rash.   ENT:  Airway intact. Patent oropharynx without erythema or exudate. Uvula midline. TMs clear b/l.   NECK: Neck supple. No midline cervical tenderness, meningismus  PULM: CTAB. Normal respiratory effort. No respiratory distress. No wheezes, stridor, rales or rhonchi. No retractions  CV: RRR, no M/R/G.   ABD: Soft, non-tender, non-distended  MSK: FROM of all extremities.  No MSK tenderness. no midline vertebral tenderness. No edema, erythema, cyanosis. Distal pulses intact.  NEURO: A+Ox3, no sensory/motor deficits  PSYCH: appropriate behavior, cooperative.

## 2019-04-15 NOTE — ED PROVIDER NOTE - CARE PROVIDER_API CALL
Darin Jean)  Internal Medicine  [address] [place] [address]  Phone: [phone]  Fax: [phone]  Follow Up Time:

## 2019-04-15 NOTE — ED ADULT NURSE NOTE - CHPI ED NUR SYMPTOMS NEG
no motor function loss/no numbness/no tingling/no difficulty bearing weight/no fatigue/no anorexia/no bladder dysfunction/no bowel dysfunction/no constipation

## 2019-04-15 NOTE — ED PROVIDER NOTE - CLINICAL SUMMARY MEDICAL DECISION MAKING FREE TEXT BOX
Patient here for sore throat, body aches, headache, back pain, cough x 1 day, most likely viral in origin. Patient understands return precautions,

## 2019-04-16 PROBLEM — I10 ESSENTIAL (PRIMARY) HYPERTENSION: Chronic | Status: ACTIVE | Noted: 2018-03-21

## 2019-04-16 PROBLEM — F32.9 MAJOR DEPRESSIVE DISORDER, SINGLE EPISODE, UNSPECIFIED: Chronic | Status: ACTIVE | Noted: 2017-04-21

## 2019-04-16 PROBLEM — M43.6 TORTICOLLIS: Chronic | Status: ACTIVE | Noted: 2017-04-21

## 2019-04-16 PROBLEM — M54.12 RADICULOPATHY, CERVICAL REGION: Chronic | Status: ACTIVE | Noted: 2017-04-21

## 2019-04-16 PROBLEM — J45.909 UNSPECIFIED ASTHMA, UNCOMPLICATED: Chronic | Status: ACTIVE | Noted: 2017-04-21

## 2019-04-16 PROBLEM — F41.9 ANXIETY DISORDER, UNSPECIFIED: Chronic | Status: ACTIVE | Noted: 2018-03-21

## 2020-06-24 ENCOUNTER — APPOINTMENT (OUTPATIENT)
Dept: VASCULAR SURGERY | Facility: CLINIC | Age: 50
End: 2020-06-24
Payer: MEDICARE

## 2020-06-24 VITALS
SYSTOLIC BLOOD PRESSURE: 120 MMHG | WEIGHT: 142 LBS | DIASTOLIC BLOOD PRESSURE: 80 MMHG | HEIGHT: 67 IN | BODY MASS INDEX: 22.29 KG/M2

## 2020-06-24 DIAGNOSIS — I83.891 VARICOSE VEINS OF RIGHT LOWER EXTREMITY WITH OTHER COMPLICATIONS: ICD-10-CM

## 2020-06-24 PROCEDURE — 99213 OFFICE O/P EST LOW 20 MIN: CPT

## 2020-06-24 PROCEDURE — 93971 EXTREMITY STUDY: CPT

## 2020-09-16 ENCOUNTER — APPOINTMENT (OUTPATIENT)
Dept: VASCULAR SURGERY | Facility: CLINIC | Age: 50
End: 2020-09-16

## 2022-06-17 NOTE — ED PROVIDER NOTE - CONDUCTED A DETAILED DISCUSSION WITH PATIENT AND/OR GUARDIAN REGARDING, MDM
SUBJECTIVE:   Thao Farfan is a 46 y.o. female who has a past medical history significant for hypertension, stroke, allergic rhinitis and chronic constipation. At previous visit the patient was placed on Norvasc and HCTZ in combination as a result of a ACE induced cough. Please refer to prior notes for details. Her cough resolved. Her blood pressure readings at home have been consistently elevated. However she states that there have been a couple days where she decided to take an additional Norvasc in the afternoon and her blood pressure responded positively. She is asking if we can adjust her medicine to reflect this. No complaints of chest pain, shortness of breath, orthopnea or PND. HPI  See above    Past Medical History, Past Surgical History, Family history, Social History, and Medications were all reviewed with the patient today and updated as necessary. Current Outpatient Medications   Medication Sig Dispense Refill    linaclotide (LINZESS) 145 MCG capsule       amLODIPine (NORVASC) 5 MG tablet Take 5 mg by mouth daily      ergocalciferol (ERGOCALCIFEROL) 1.25 MG (58282 UT) capsule Take 50,000 Units by mouth every 7 days      hydroCHLOROthiazide (MICROZIDE) 12.5 MG capsule Take 12.5 mg by mouth daily      montelukast (SINGULAIR) 10 MG tablet TAKE 1 TABLET BY MOUTH EVERY DAY       No current facility-administered medications for this visit.      No Known Allergies  Patient Active Problem List   Diagnosis    Chronic constipation    History of colon polyps    Discharge from the vagina    Cardiac arrhythmia    Essential hypertension    Severe obesity ([facility] [address]     Past Medical History:   Diagnosis Date    Discharge from the vagina     Heart palpitations 08/2017    wore holter monitor- per pt: occasional PVC's \"benign\"    Heavy menses     Helicobacter pylori gastritis 07/2017    treated with medication    Hypertension 3/1995    managed with meds    Iron deficiency anemia followed by Dr Yenny Rock (heme-onc); iron infusions x3 (last infusion 8/2017)    Sickle cell trait [facility]) dx 1995    Uterine fibroid      Past Surgical History:   Procedure Laterality Date    COLONOSCOPY  07/27/2017,11/10/21    small polyp removed - benign - bx on small intestine and stomach - benign    HYSTERECTOMY, TOTAL ABDOMINAL (CERVIX REMOVED)  12/27/2017    with bilateral salpingectomy    LAP,TUBAL CAUTERY  10/2008    ORTHOPEDIC SURGERY      foot Right    PELVIC LAPAROSCOPY  2013    with polypectomy     Family History   Problem Relation Age of Onset    Breast Cancer Neg Hx     Heart Disease Mother     Hypertension Mother     Ovarian Cancer Neg Hx     Diabetes Mother     Colon Cancer Maternal Aunt      Social History     Tobacco Use    Smoking status: Never Smoker    Smokeless tobacco: Never Used   Substance Use Topics    Alcohol use: No         Review of Systems  See above    OBJECTIVE:  BP (!) 152/92   Ht 5' 4\" (1.626 m)   Wt 206 lb 6.4 oz (93.6 kg)   BMI 35.43 kg/m²      Physical Exam  Constitutional:       General: She is not in acute distress. Appearance: Normal appearance. She is not ill-appearing. HENT:      Head: Normocephalic and atraumatic. Cardiovascular:      Rate and Rhythm: Normal rate and regular rhythm. Heart sounds: Normal heart sounds. No murmur heard. Pulmonary:      Effort: Pulmonary effort is normal.      Breath sounds: Normal breath sounds. No wheezing or rhonchi. Musculoskeletal:         General: Normal range of motion. Cervical back: Normal range of motion and neck supple. Right lower leg: No edema. Left lower leg: No edema. Skin:     General: Skin is warm. Findings: No rash. Neurological:      Mental Status: She is alert and oriented to person, place, and time. Psychiatric:         Mood and Affect: Mood normal.         Behavior: Behavior normal.         Thought Content:  Thought content normal.         Judgment: Judgment normal.         Medical problems and test results were reviewed with the patient today. ASSESSMENT and PLAN    1. High cholesterol. LDL is 126. Previously 147. Congratulated her on her improvement. Continue dietary focus. 2.  Hypertension. Blood pressure 152/92. Increase Norvasc to twice a day. Continue HCTZ in the morning. Reevaluate in 4 to 6 weeks. 3.  Cough. Resolved. Elements of this note have been dictated using speech recognition software. As a result, errors of speech recognition may have occurred. need for outpatient follow-up/return to ED if symptoms worsen, persist or questions arise

## 2024-06-11 ENCOUNTER — EMERGENCY (EMERGENCY)
Facility: HOSPITAL | Age: 54
LOS: 1 days | Discharge: HOME | End: 2024-06-11
Attending: EMERGENCY MEDICINE | Admitting: EMERGENCY MEDICINE
Payer: MEDICARE

## 2024-06-11 VITALS
RESPIRATION RATE: 18 BRPM | SYSTOLIC BLOOD PRESSURE: 163 MMHG | DIASTOLIC BLOOD PRESSURE: 116 MMHG | HEIGHT: 67 IN | OXYGEN SATURATION: 96 % | WEIGHT: 214.07 LBS | HEART RATE: 99 BPM | TEMPERATURE: 98 F

## 2024-06-11 DIAGNOSIS — Z98.890 OTHER SPECIFIED POSTPROCEDURAL STATES: Chronic | ICD-10-CM

## 2024-06-11 PROCEDURE — 99283 EMERGENCY DEPT VISIT LOW MDM: CPT | Mod: FS

## 2024-06-11 PROCEDURE — 99283 EMERGENCY DEPT VISIT LOW MDM: CPT

## 2024-06-11 RX ORDER — HYDROCORTISONE 1 %
1 OINTMENT (GRAM) TOPICAL
Qty: 1 | Refills: 0
Start: 2024-06-11 | End: 2024-06-17

## 2024-06-11 RX ORDER — HYDROCORTISONE 1 %
1 OINTMENT (GRAM) TOPICAL
Qty: 1 | Refills: 0
Start: 2024-06-11 | End: 2024-06-22

## 2024-06-11 NOTE — ED PROVIDER NOTE - ATTENDING APP SHARED VISIT CONTRIBUTION OF CARE
55 yo F with intermittent rectal bleeding x 1 year.  No dizziness, weakness, sob, lightheadedness.  Denies ab pain, fever, rectal pain.  Pt looks well, VSS, belly soft nontender.  Rectal w/o hemorrhoid or fissure.  No mass or blood on JULIO per PA.  Brown stool.  Given lack of active bleeding or signs of anemia, will reassure, dc with close GI fu.  Pt unsure of last colonoscopy.

## 2024-06-11 NOTE — ED PROVIDER NOTE - NSFOLLOWUPINSTRUCTIONS_ED_ALL_ED_FT
Rectal Bleeding  Body outline showing the digestive tract, including the anus.  Rectal bleeding is when blood comes out of the opening of the butt (anus). You may see bright red blood in your underwear or in the toilet after you poop (have a bowel movement). You may also have blood mixed with your poop (stool), or dark red or black poop.    Rectal bleeding is often a sign that something is wrong. It can be caused by many things. It needs to be checked by a doctor.    Follow these instructions at home:  Medicines    Take over-the-counter and prescription medicines only as told by your doctor.  Ask your doctor about changing or stopping your normal medicines. These include blood thinners.  Managing constipation    Pear, berries, artichoke, and dried beans.  Your condition may cause trouble pooping (constipation). To prevent or treat this, or to help make your poop soft, you may need to:  Drink enough fluid to keep your pee (urine) pale yellow.  Take over-the-counter or prescription medicines.  Eat foods that are high in fiber. These include beans, whole grains, and fresh fruits and vegetables.  Limit foods that are high in fat and sugar. These include fried or sweet foods.  General instructions    Try not to strain when you poop.  Take a warm bath. This may help with pain.  Watch for changes in your symptoms.  Contact a doctor if:  You have pain or swelling in your belly (abdomen).  You have a fever.  You feel weak or like you may vomit.  You cannot poop.  You have new or more bleeding.  You have black or dark red poop.  You vomit blood or something that looks like coffee grounds.  Get help right away if:  You faint.  You have very bad pain in your butt.  These symptoms may be an emergency. Get help right away. Call 911.  Do not wait to see if the symptoms will go away.  Do not drive yourself to the hospital.  This information is not intended to replace advice given to you by your health care provider. Make sure you discuss any questions you have with your health care provider.

## 2024-06-11 NOTE — ED PROVIDER NOTE - OBJECTIVE STATEMENT
55 y/o f presents c/o rectal discomfort, intermittent rectal bleeding for >1 yr.  Pt stating she has been seen in the ED at other hospitals for this in the past, thinks she may have had a colonoscopy a few years ago but not sure.  Pt reports small blood in toilet yesterday and blood with wiping.  Denies abd pain, dizziness, CP, SOB, all other ROS negative.

## 2024-06-11 NOTE — ED ADULT NURSE NOTE - NS ED NURSE DC INFO COMPLEXITY
I have discussed the history, exam and medical decision making with resident. I agree with the Resident's findings and plan, as documented in today's note. I personally interviewed this patient, history (passage of tissue and lack of bleeding now) suggests that she passed pregnancy tissue. Recommend repeat urine pregnancy test in 3 wks and let us know if + or let us know if she has irregular bleeding before then. Ok to start contraception today, aware of need for backup for 7 days.    Simple: Patient demonstrates quick and easy understanding

## 2024-06-11 NOTE — ED PROVIDER NOTE - CARE PROVIDER_API CALL
Jose Rinaldi  Gastroenterology  [address]  [place] [address]  Phone: [phone]  Fax: [phone]  Follow Up Time:

## 2024-06-11 NOTE — ED PROVIDER NOTE - PATIENT PORTAL LINK FT
You can access the FollowMyHealth Patient Portal offered by [facility] by registering at the following website: [email or web address]. By joining [employer]’s FollowMyHealth portal, you will also be able to view your health information using other applications (apps) compatible with our system.

## 2024-06-11 NOTE — ED ADULT TRIAGE NOTE - CHIEF COMPLAINT QUOTE
Pt c/o intermittent rectal bleeding, pain x 3 years. History of hemorrhoids. Denies anticoagulant use. PMH HTN, non compliant with medications.

## 2024-06-13 DIAGNOSIS — Z88.7 ALLERGY STATUS TO SERUM AND VACCINE: ICD-10-CM

## 2024-06-13 DIAGNOSIS — Z91.012 ALLERGY TO EGGS: ICD-10-CM

## 2024-06-13 DIAGNOSIS — K62.89 OTHER SPECIFIED DISEASES OF ANUS AND RECTUM: ICD-10-CM

## 2024-06-13 DIAGNOSIS — K62.5 HEMORRHAGE OF ANUS AND RECTUM: ICD-10-CM

## 2024-07-26 ENCOUNTER — EMERGENCY (EMERGENCY)
Facility: HOSPITAL | Age: 54
LOS: 1 days | Discharge: HOME | End: 2024-07-26
Attending: STUDENT IN AN ORGANIZED HEALTH CARE EDUCATION/TRAINING PROGRAM | Admitting: STUDENT IN AN ORGANIZED HEALTH CARE EDUCATION/TRAINING PROGRAM
Payer: MEDICARE

## 2024-07-26 VITALS
OXYGEN SATURATION: 96 % | DIASTOLIC BLOOD PRESSURE: 100 MMHG | HEIGHT: 67 IN | WEIGHT: 199.96 LBS | SYSTOLIC BLOOD PRESSURE: 147 MMHG | TEMPERATURE: 98 F | HEART RATE: 95 BPM | RESPIRATION RATE: 18 BRPM
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
## 2024-07-26 DIAGNOSIS — S99.911A UNSPECIFIED INJURY OF RIGHT ANKLE, INITIAL ENCOUNTER: ICD-10-CM

## 2024-07-26 DIAGNOSIS — S93.401A SPRAIN OF UNSPECIFIED LIGAMENT OF RIGHT ANKLE, INITIAL ENCOUNTER: ICD-10-CM
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
## 2024-07-26 DIAGNOSIS — Z98.890 OTHER SPECIFIED POSTPROCEDURAL STATES: Chronic | ICD-10-CM

## 2024-07-26 DIAGNOSIS — Z91.012 ALLERGY TO EGGS: ICD-10-CM

## 2024-07-26 PROCEDURE — 73610 X-RAY EXAM OF ANKLE: CPT

## 2024-07-26 PROCEDURE — 99284 EMERGENCY DEPT VISIT MOD MDM: CPT

## 2024-07-26 PROCEDURE — 99283 EMERGENCY DEPT VISIT LOW MDM: CPT | Mod: 25

## 2024-07-26 PROCEDURE — 73610 X-RAY EXAM OF ANKLE: CPT | Mod: 26,RT

## 2024-07-26 RX ADMIN — Medication 600 MILLIGRAM(S): at 10:12

## 2024-07-26 NOTE — ED PROVIDER NOTE - OBJECTIVE STATEMENT
55 yo F presenting with R ankle injury. Pt states she was doing exercises and began to have R ankle pain, worst along lateral aspect. Now worst with ambulation. No numbness, weakness, tingling. No direct trauma. No swelling. ROS as above.

## 2024-07-26 NOTE — ED PROVIDER NOTE - NSFOLLOWUPINSTRUCTIONS_ED_ALL_ED_FT
You were seen in the Emergency Department for ankle pain. You had an XRAY that showed no acute fracture. Please take tylenol or motrin as needed. Elevate your ankle as much as possible. Apply ice to the area. Return for worsening symptoms, worsening pain. Otherwise, please follow up with your primary physician.    I hope you feel better soon!    Sincerely,  Al Juarez MD

## 2024-07-26 NOTE — ED PROVIDER NOTE - PHYSICAL EXAMINATION
general: Well appearing, in no acute distress  HEENT: Normocephalic, atraumatic, extraocular movements intact  CV: Regular rate  Pulm: No respiratory distress, no tachypnea  Abd: Flat, no gross distension  Ext: warm and well perfused, ttp along R ankle by lateral aspect, full rom, 2+ pulses, no calf tenderness  Skin: No gross rashes or lesions  Neuro: Alert and oriented, moving all extremities

## 2024-07-26 NOTE — ED PROVIDER NOTE - NS ED ROS FT
Constitutional: No fever or chills  Eyes: No discharge or drainage  Ears, Nose, Mouth, Throat: No nasal discharge, no sore throat  Cardiovascular: No chest pain, no palpitations  Respiratory: No shortness of breath, no cough  Gastrointestinal: No nausea or vomiting, no abdominal pain, no diarrhea or constipation  Musculoskeletal: + ankle pain/swelling  Skin: No rashes or lesions  Neurological: No numbness, weakness, tingling, no headache  Psychiatric: No depression

## 2024-07-26 NOTE — ED PROVIDER NOTE - PATIENT PORTAL LINK FT
You can access the FollowMyHealth Patient Portal offered by [facility] by registering at the following website: [email or web address]. By joining [employer]’s FollowMyHealth portal, you will also be able to view your health information using other applications (apps) compatible with our system.

## 2024-07-26 NOTE — ED ADULT NURSE NOTE - OBJECTIVE STATEMENT
Patient c/o of right ankle pain, started yesterday while walking, denies tripping/fall or injury, ambulates w/ pain and difficulty using a cane, no obvious deformity.

## 2024-07-26 NOTE — ED ADULT NURSE NOTE - CHIEF COMPLAINT QUOTE
53 y/o female c/o right ankle pain after injury yesterday, pt has been walking on foot which exacerbated injury, swelling noted to right ankle.

## 2024-07-26 NOTE — ED ADULT TRIAGE NOTE - CHIEF COMPLAINT QUOTE
55 y/o female c/o right ankle pain after injury yesterday, pt has been walking on foot which exacerbated injury, swelling noted to right ankle.

## 2024-07-26 NOTE — ED ADULT NURSE NOTE - NSFALLRISKINTERV_ED_ALL_ED

## 2024-07-26 NOTE — ED ADULT NURSE NOTE - NSFALLLASTSIX_ED_ALL_ED
Per , they received a call from [facility] pharmacy regarding RX for pen needles and needs Dr. Perez's NPI.    Called pharmacy and s/w Carla and confirmed there is no issue with the RX and is ready for pt to p/u.   
Received request from [facility]  Delivered requesting for NPI of Dr. Perez (covering MD for Dr. Verde). Filled out form as requested and faxed back to [phone] with fax confirmation.   
No.

## 2024-07-26 NOTE — ED ADULT NURSE NOTE - NSICDXPASTMEDICALHX_GEN_ALL_CORE_FT
PAST MEDICAL HISTORY:  Anxiety     Asthma     Cervical radiculopathy     Depression     Hypertension     Torticollis

## 2024-10-20 ENCOUNTER — EMERGENCY (EMERGENCY)
Facility: HOSPITAL | Age: 54
LOS: 1 days | Discharge: HOME | End: 2024-10-20
Attending: EMERGENCY MEDICINE | Admitting: EMERGENCY MEDICINE
Payer: MEDICARE

## 2024-10-20 VITALS
HEART RATE: 99 BPM | TEMPERATURE: 98 F | SYSTOLIC BLOOD PRESSURE: 147 MMHG | RESPIRATION RATE: 18 BRPM | DIASTOLIC BLOOD PRESSURE: 112 MMHG | OXYGEN SATURATION: 98 %

## 2024-10-20 VITALS
WEIGHT: 199.96 LBS | SYSTOLIC BLOOD PRESSURE: 158 MMHG | TEMPERATURE: 98 F | HEART RATE: 113 BPM | DIASTOLIC BLOOD PRESSURE: 112 MMHG | RESPIRATION RATE: 17 BRPM | OXYGEN SATURATION: 99 %

## 2024-10-20 DIAGNOSIS — Z98.890 OTHER SPECIFIED POSTPROCEDURAL STATES: Chronic | ICD-10-CM

## 2024-10-20 LAB
APPEARANCE UR: ABNORMAL
BACTERIA # UR AUTO: ABNORMAL /HPF
BILIRUB UR-MCNC: NEGATIVE — SIGNIFICANT CHANGE UP
CAST: 0 /LPF — SIGNIFICANT CHANGE UP (ref 0–4)
COLOR SPEC: SIGNIFICANT CHANGE UP
DIFF PNL FLD: ABNORMAL
GLUCOSE UR QL: NEGATIVE MG/DL — SIGNIFICANT CHANGE UP
KETONES UR-MCNC: ABNORMAL MG/DL
LEUKOCYTE ESTERASE UR-ACNC: ABNORMAL
MUCOUS THREADS # UR AUTO: PRESENT
NITRITE UR-MCNC: NEGATIVE — SIGNIFICANT CHANGE UP
PH UR: 5.5 — SIGNIFICANT CHANGE UP (ref 5–8)
PROT UR-MCNC: 100 MG/DL
RBC CASTS # UR COMP ASSIST: 54 /HPF — HIGH (ref 0–4)
SP GR SPEC: >1.03 — HIGH (ref 1–1.03)
SQUAMOUS # UR AUTO: 16 /HPF — HIGH (ref 0–5)
UROBILINOGEN FLD QL: 1 MG/DL — SIGNIFICANT CHANGE UP (ref 0.2–1)
WBC CLUMPS # UR AUTO: PRESENT
WBC UR QL: >998 /HPF — HIGH (ref 0–5)

## 2024-10-20 PROCEDURE — 99284 EMERGENCY DEPT VISIT MOD MDM: CPT

## 2024-10-20 RX ORDER — CEFPODOXIME PROXETIL 50 MG/5 ML
200 SUSPENSION, RECONSTITUTED, ORAL (ML) ORAL ONCE
Refills: 0 | Status: COMPLETED | OUTPATIENT
Start: 2024-10-20 | End: 2024-10-20

## 2024-10-20 RX ORDER — CEFPODOXIME PROXETIL 50 MG/5 ML
1 SUSPENSION, RECONSTITUTED, ORAL (ML) ORAL
Qty: 20 | Refills: 0
Start: 2024-10-20 | End: 2024-10-29

## 2024-10-20 RX ORDER — AMLODIPINE BESYLATE 5 MG
1 TABLET ORAL
Qty: 30 | Refills: 0
Start: 2024-10-20 | End: 2024-11-18

## 2024-10-20 RX ORDER — AMLODIPINE BESYLATE 5 MG
5 TABLET ORAL ONCE
Refills: 0 | Status: COMPLETED | OUTPATIENT
Start: 2024-10-20 | End: 2024-10-20

## 2024-10-20 RX ADMIN — Medication 200 MILLIGRAM(S): at 14:52

## 2024-10-20 RX ADMIN — Medication 5 MILLIGRAM(S): at 14:52

## 2024-10-20 NOTE — ED ADULT NURSE NOTE - OBJECTIVE STATEMENT
Patient to ED c/o dysuria and subjective fevers x 4 days. Denies abdominal pain or hematuria. AAOX4, NAD.

## 2024-10-20 NOTE — ED PROVIDER NOTE - OBJECTIVE STATEMENT
Pt w/ PMHx HTN (was previously on unknown med, ran out and has not f/u w/ a PCP), PSHx varicose vein surgery p/w days of urinary frequency, urgency, and dysuria. No hesitancy or hematuria. NO flank or abd pain. No n/v. She reports tactile fever, temp not checked.  No HA, CP, SOB, vision changes, or other acute complaints.

## 2024-10-20 NOTE — ED PROVIDER NOTE - NS ED ROS FT
Constitutional: See HPI  Eyes: No pain, blurry vision, or discharge.  Cardiac: No chest pain, SOB   GI: No nausea, vomiting, diarrhea or abdominal pain.  : See HPI  MS: No myalgia, muscle weakness, joint pain or back pain.  Neuro: No headache or weakness. No LOC.  Skin: No skin rash.   Endocrine: No history of thyroid disease or diabetes.  Except as documented in the HPI, all other systems are negative.

## 2024-10-20 NOTE — ED ADULT TRIAGE NOTE - CHIEF COMPLAINT QUOTE
urinary burning and frequency with subjective chills. anxious in triage ; did not take BP meds yet today. denies cp, SOB. NAD.

## 2024-10-20 NOTE — ED PROVIDER NOTE - NSFOLLOWUPINSTRUCTIONS_ED_ALL_ED_FT
You were evaluated in the Emergency Department) ED for urinary symptoms. You have a urinary tract infection. The treatment is antibiotics. You received the first dose of antibiotics (Cefpodoxime, similar to Penicillin) here in the ED, and a prescription has been sent to your pharmacy. IT IS IMPORTANT YOU TAKE THE ANTIBIOTICS UNTIL THEY ARE COMPLETED. A urine culture was sent today and will result in 2-3 days. If your antibiotics need to be changed based on this culture, you will be contacted.    Your blood pressure was high and your report history of high blood pressure. It is important blood pressure is treated to prevent other significant illness such as heart attack, stroke, blindness, loss of limbs, etc. You are being started on a blood pressure medication called Amlodipine. You received the first dose in the ED.   You should check your blood pressure at home 2-3 times daily, write it down, and bring this information to your primary doctor, so your medications can be adjusted accordingly. Follow the below DASH diet to help lower your blood pressure. If you have sustained blood pressures greater than 200/100 with symptoms (headache, vision changes, chest pain, shortness of breath, abdominal pain, back pain, decreased urination, or other concerning symptoms), return to the ED.     It is also very important you have a regular medical doctor for routine health maintenance. You may use the names in this documentation, and you may also call our referrals coordinator at [phone] Monday - Friday 11 am - 7 pm for assistance in making an appointment.     Urinary Tract Infection    A urinary tract infection (UTI) is an infection of any part of the urinary tract, which includes the kidneys, ureters, bladder, and urethra. Risk factors include ignoring your need to urinate, wiping back to front if female, being an uncircumcised male, and having diabetes or a weak immune system. Symptoms include frequent urination, pain or burning with urination, foul smelling urine, cloudy urine, pain in the lower abdomen, blood in the urine, and fever. If you were prescribed an antibiotic medicine, take it as told by your health care provider. Do not stop taking the antibiotic even if you start to feel better.    SEEK IMMEDIATE MEDICAL CARE IF YOU HAVE ANY OF THE FOLLOWING SYMPTOMS: severe back or abdominal pain, fever, inability to keep fluids or medicine down, dizziness/lightheadedness, or a change in mental status.     Hypertension    Hypertension, commonly called high blood pressure, is when the force of blood pumping through your arteries is too strong. Hypertension forces your heart to work harder to pump blood. Your arteries may become narrow or stiff. Having untreated or uncontrolled hypertension for a long period of time can cause heart attack, stroke, kidney disease, and other problems. If started on a medication, take exactly as prescribed by your health care professional. Maintain a healthy lifestyle and follow up with your primary care physician.    SEEK IMMEDIATE MEDICAL CARE IF YOU HAVE ANY OF THE FOLLOWING SYMPTOMS: severe headache, confusion, chest pain, abdominal pain, vomiting, or shortness of breath.     DASH Eating Plan  DASH stands for Dietary Approaches to Stop Hypertension. The DASH eating plan is a healthy eating plan that has been shown to:  Lower high blood pressure (hypertension).  Reduce your risk for type 2 diabetes, heart disease, and stroke.  Help with weight loss.  What are tips for following this plan?  Reading food labels    Check food labels for the amount of salt (sodium) per serving. Choose foods with less than 5 percent of the Daily Value (DV) of sodium. In general, foods with less than 300 milligrams (mg) of sodium per serving fit into this eating plan.  To find whole grains, look for the word "whole" as the first word in the ingredient list.  Shopping    Buy products labeled as "low-sodium" or "no salt added."  Buy fresh foods. Avoid canned foods and pre-made or frozen meals.  Cooking    Try not to add salt when you cook. Use salt-free seasonings or herbs instead of table salt or sea salt. Check with your health care provider or pharmacist before using salt substitutes.  Do not spaulding foods. Cook foods in healthy ways, such as baking, boiling, grilling, roasting, or broiling.  Cook using oils that are good for your heart. These include olive, canola, avocado, soybean, and sunflower oil.  Meal planning    A plate with examples of foods in a healthy diet.  Eat a balanced diet. This should include:  4 or more servings of fruits and 4 or more servings of vegetables each day. Try to fill half of your plate with fruits and vegetables.  6–8 servings of whole grains each day.  6 or less servings of lean meat, poultry, or fish each day. 1 oz is 1 serving. A 3 oz (85 g) serving of meat is about the same size as the palm of your hand. One egg is 1 oz (28 g).  2–3 servings of low-fat dairy each day. One serving is 1 cup (237 mL).  1 serving of nuts, seeds, or beans 5 times each week.  2–3 servings of heart-healthy fats. Healthy fats called omega-3 fatty acids are found in foods such as walnuts, flaxseeds, fortified milks, and eggs. These fats are also found in cold-water fish, such as sardines, salmon, and mackerel.  Limit how much you eat of:  Canned or prepackaged foods.  Food that is high in trans fat, such as fried foods.  Food that is high in saturated fat, such as fatty meat.  Desserts and other sweets, sugary drinks, and other foods with added sugar.  Full-fat dairy products.  Do not salt foods before eating.  Do not eat more than 4 egg yolks a week.  Try to eat at least 2 vegetarian meals a week.  Eat more home-cooked food and less restaurant, buffet, and fast food.  Lifestyle    When eating at a restaurant, ask if your food can be made with less salt or no salt.  If you drink alcohol:  Limit how much you have to:  0–1 drink a day if you are female.  0–2 drinks a day if you are male.  Know how much alcohol is in your drink. In the [place] one drink is one 12 oz bottle of beer (355 mL), one 5 oz glass of wine (148 mL), or one 1½ oz glass of hard liquor (44 mL).  General information    Avoid eating more than 2,300 mg of salt a day. If you have hypertension, you may need to reduce your sodium intake to 1,500 mg a day.  Work with your provider to stay at a healthy body weight or lose weight. Ask what the best weight range is for you.  On most days of the week, get at least 30 minutes of exercise that causes your heart to beat faster. This may include walking, swimming, or biking.  Work with your provider or dietitian to adjust your eating plan to meet your specific calorie needs.  What foods should I eat?  Fruits    All fresh, dried, or frozen fruit. Canned fruits that are in their natural juice and do not have sugar added to them.    Vegetables    Fresh or frozen vegetables that are raw, steamed, roasted, or grilled. Low-sodium or reduced-sodium tomato and vegetable juice. Low-sodium or reduced-sodium tomato sauce and tomato paste. Low-sodium or reduced-sodium canned vegetables.    Grains    Whole-grain or whole-wheat bread. Whole-grain or whole-wheat pasta. Brown rice. Oatmeal. Quinoa. Bulgur. Whole-grain and low-sodium cereals. Katarina bread. Low-fat, low-sodium crackers. Whole-wheat flour tortillas.    Meats and other proteins    Skinless chicken or turkey. Ground chicken or turkey. Pork with fat trimmed off. Fish and seafood. Egg whites. Dried beans, peas, or lentils. Unsalted nuts, nut butters, and seeds. Unsalted canned beans. Lean cuts of beef with fat trimmed off. Low-sodium, lean precooked or cured meat, such as sausages or meat loaves.    Dairy    Low-fat (1%) or fat-free (skim) milk. Reduced-fat, low-fat, or fat-free cheeses. Nonfat, low-sodium ricotta or cottage cheese. Low-fat or nonfat yogurt. Low-fat, low-sodium cheese.    Fats and oils    Soft margarine without trans fats. Vegetable oil. Reduced-fat, low-fat, or light mayonnaise and salad dressings (reduced-sodium). Canola, safflower, olive, avocado, soybean, and sunflower oils. Avocado.    Seasonings and condiments    Herbs. Spices. Seasoning mixes without salt.    Other foods    Unsalted popcorn and pretzels. Fat-free sweets.    The items listed above may not be all the foods and drinks you can have. Talk to a dietitian to learn more.    What foods should I avoid?  Fruits    Canned fruit in a light or heavy syrup. Fried fruit. Fruit in cream or butter sauce.    Vegetables    Creamed or fried vegetables. Vegetables in a cheese sauce. Regular canned vegetables that are not marked as low-sodium or reduced-sodium. Regular canned tomato sauce and paste that are not marked as low-sodium or reduced-sodium. Regular tomato and vegetable juices that are not marked as low-sodium or reduced-sodium. Pickles. Olives.    Grains    Baked goods made with fat, such as croissants, muffins, or some breads. Dry pasta or rice meal packs.    Meats and other proteins    Fatty cuts of meat. Ribs. Fried meat. Simental. Bologna, salami, and other precooked or cured meats, such as sausages or meat loaves, that are not lean and low in sodium. Fat from the back of a pig (fatback). Bratwurst. Salted nuts and seeds. Canned beans with added salt. Canned or smoked fish. Whole eggs or egg yolks. Chicken or turkey with skin.    Dairy    Whole or 2% milk, cream, and half-and-half. Whole or full-fat cream cheese. Whole-fat or sweetened yogurt. Full-fat cheese. Nondairy creamers. Whipped toppings. Processed cheese and cheese spreads.    Fats and oils    Butter. Stick margarine. Lard. Shortening. Ghee. Simental fat. Tropical oils, such as coconut, palm kernel, or palm oil.    Seasonings and condiments    Onion salt, garlic salt, seasoned salt, table salt, and sea salt. Worcestershire sauce. Tartar sauce. Barbecue sauce. Teriyaki sauce. Soy sauce, including reduced-sodium soy sauce. Steak sauce. Canned and packaged gravies. Fish sauce. Oyster sauce. Cocktail sauce. Store-bought horseradish. Ketchup. Mustard. Meat flavorings and tenderizers. Bouillon cubes. Hot sauces. Pre-made or packaged marinades. Pre-made or packaged taco seasonings. Relishes. Regular salad dressings.    Other foods    Salted popcorn and pretzels.    The items listed above may not be all the foods and drinks you should avoid. Talk to a dietitian to learn more.    Where to find more information  National Heart, Lung, and Blood [place] (NHLBI): nhlbi.nih.gov  American Heart Association (AHA): heart.org  Academy of Nutrition and Dietetics: eatright.org  National Kidney Foundation (NKF): kidney.org  This information is not intended to replace advice given to you by your health care provider. Make sure you discuss any questions you have with your health care provider.

## 2024-10-20 NOTE — ED PROVIDER NOTE - PATIENT PORTAL LINK FT
You can access the FollowMyHealth Patient Portal offered by [facility] by registering at the following website: [email or web address]. By joining [employer]’s FollowMyHealth portal, you will also be able to view your health information using other applications (apps) compatible with our system.

## 2024-10-20 NOTE — ED PROVIDER NOTE - PHYSICAL EXAMINATION
Constitutional: Well appearing, awake, alert, oriented to person, place, time/situation and in no apparent distress.  ENMT: Airway patent. Normal MM  Eyes: Clear bilaterally  Cardiac: Normal rate, regular rhythm.  Heart sounds S1, S2.  No murmurs, rubs or gallops.  Respiratory: Breaths sounds equal and clear b/l. No increased WOB, tachypnea, hypoxia, or accessory mm use. Pt speaks in full sentences.   Gastrointestinal: Abd soft, NT, ND, NABS. No guarding, rebound, or rigidity. No pulsatile abdominal masses. No organomegaly appreciated. No CVAT   Musculoskeletal: Range of motion is not limited  Neuro: Alert and oriented x 3, face symmetric and speech fluent. Strength 5/5 x 4 ext and symmetric, nml gross motor movement, nml gait. No focal deficits noted.  Skin: Skin normal color for race, warm, dry and intact. No evidence of rash.  Psych: Alert and oriented to person, place, time/situation. normal mood and affect. no apparent risk to self or others.

## 2024-10-20 NOTE — ED PROVIDER NOTE - CLINICAL SUMMARY MEDICAL DECISION MAKING FREE TEXT BOX
Pt p/w urinary sx of UTI, reports tactile fever, ? early pyelo. No abd pain / ttp / CVAT.   A/w HTN, known hx, non compliant w/ f/u and meds. D/w pt importance of routine medical care, health maintenance. Will start Amlodipine, f/u [facility]. Given [facility] info as welll as  for [facility] to connect pt w/ PCP. Return precautions

## 2024-10-20 NOTE — ED PROVIDER NOTE - NSFOLLOWUPCLINICS_GEN_ALL_ED_FT
[facility]  Family Medicine  [address]  [place], [place] [address]  Phone: [phone]  Fax:

## 2024-10-20 NOTE — ED PROVIDER NOTE - CCCP TRG CHIEF CMPLNT
UTI s/s You can access the FollowMyHealth Patient Portal offered by [facility] by registering at the following website: [email or web address]. By joining [employer]’s FollowMyHealth portal, you will also be able to view your health information using other applications (apps) compatible with our system.

## 2024-10-21 LAB
CULTURE RESULTS: SIGNIFICANT CHANGE UP
SPECIMEN SOURCE: SIGNIFICANT CHANGE UP

## 2024-10-22 DIAGNOSIS — I10 ESSENTIAL (PRIMARY) HYPERTENSION: ICD-10-CM

## 2024-10-22 DIAGNOSIS — Z88.7 ALLERGY STATUS TO SERUM AND VACCINE: ICD-10-CM

## 2024-10-22 DIAGNOSIS — Z91.148 PATIENT'S OTHER NONCOMPLIANCE WITH MEDICATION REGIMEN FOR OTHER REASON: ICD-10-CM

## 2024-10-22 DIAGNOSIS — N39.0 URINARY TRACT INFECTION, SITE NOT SPECIFIED: ICD-10-CM

## 2024-10-22 DIAGNOSIS — Z91.012 ALLERGY TO EGGS: ICD-10-CM

## 2024-11-20 PROCEDURE — 87086 URINE CULTURE/COLONY COUNT: CPT

## 2024-11-20 PROCEDURE — 99284 EMERGENCY DEPT VISIT MOD MDM: CPT

## 2024-11-20 PROCEDURE — 81001 URINALYSIS AUTO W/SCOPE: CPT

## 2024-12-09 NOTE — ED PROVIDER NOTE - CLINICAL SUMMARY MEDICAL DECISION MAKING FREE TEXT BOX
81
55 y/o f presents c/o intermittent rectal bleeding for >1 yr.  No bleeding on exam, vitals unremarkable, will rx anusol for possible internal hemorrhoid, discussed importance of f/u with GI for colonoscopy and definitive diagnosis of rectal bleeding.  Pt expressed understanding, given info for f/u, return precautions for worsening symptoms